# Patient Record
Sex: FEMALE | Race: ASIAN | NOT HISPANIC OR LATINO
[De-identification: names, ages, dates, MRNs, and addresses within clinical notes are randomized per-mention and may not be internally consistent; named-entity substitution may affect disease eponyms.]

---

## 2022-08-04 ENCOUNTER — APPOINTMENT (OUTPATIENT)
Dept: CARDIOLOGY | Facility: CLINIC | Age: 57
End: 2022-08-04
Payer: MEDICAID

## 2022-08-04 ENCOUNTER — APPOINTMENT (OUTPATIENT)
Dept: CARDIOLOGY | Facility: CLINIC | Age: 57
End: 2022-08-04

## 2022-08-04 VITALS
HEART RATE: 63 BPM | OXYGEN SATURATION: 97 % | HEIGHT: 62.2 IN | WEIGHT: 118 LBS | RESPIRATION RATE: 18 BRPM | DIASTOLIC BLOOD PRESSURE: 73 MMHG | BODY MASS INDEX: 21.44 KG/M2 | TEMPERATURE: 98 F | SYSTOLIC BLOOD PRESSURE: 111 MMHG

## 2022-08-04 DIAGNOSIS — Z78.9 OTHER SPECIFIED HEALTH STATUS: ICD-10-CM

## 2022-08-04 DIAGNOSIS — E78.5 HYPERLIPIDEMIA, UNSPECIFIED: ICD-10-CM

## 2022-08-04 DIAGNOSIS — Z82.49 FAMILY HISTORY OF ISCHEMIC HEART DISEASE AND OTHER DISEASES OF THE CIRCULATORY SYSTEM: ICD-10-CM

## 2022-08-04 PROBLEM — Z00.00 ENCOUNTER FOR PREVENTIVE HEALTH EXAMINATION: Status: ACTIVE | Noted: 2022-08-04

## 2022-08-04 PROCEDURE — 99204 OFFICE O/P NEW MOD 45 MIN: CPT

## 2022-08-04 PROCEDURE — 93306 TTE W/DOPPLER COMPLETE: CPT

## 2022-08-21 PROBLEM — E78.5 HLD (HYPERLIPIDEMIA): Status: ACTIVE | Noted: 2022-08-21

## 2024-04-02 PROBLEM — Z82.49 FAMILY HISTORY OF HYPERTENSION: Status: ACTIVE | Noted: 2024-04-02

## 2024-04-02 PROBLEM — Z78.9 SOCIAL ALCOHOL USE: Status: ACTIVE | Noted: 2024-04-02

## 2024-04-02 NOTE — HISTORY OF PRESENT ILLNESS
[FreeTextEntry1] : 57 year-old female with HLD presents for evaluation of abnormal ECG.  Patient was noted to have an abnormal ECG by PCP, showing q waves V3-V6, II, III, aVF.  Patient reports that recently she has experienced 3 episodes of SSCP when angry.  Patient denies CP or SOB with exertion.  Patient denies palpitations.  Patient denies h/o syncope.  Patient reports occasional dizziness and nausea.  I advised patient to undergo an echocardiogram.  Patient underwent an echocardiogram and it showed normal LV function without significant valvular pathology.  Patient's ECG abnormality likely represents a normal variant.

## 2024-04-03 ENCOUNTER — APPOINTMENT (OUTPATIENT)
Dept: CARDIOLOGY | Facility: CLINIC | Age: 59
End: 2024-04-03
Payer: MEDICAID

## 2024-04-03 VITALS
HEART RATE: 70 BPM | SYSTOLIC BLOOD PRESSURE: 142 MMHG | OXYGEN SATURATION: 99 % | TEMPERATURE: 97.8 F | WEIGHT: 124 LBS | RESPIRATION RATE: 18 BRPM | BODY MASS INDEX: 22.53 KG/M2 | DIASTOLIC BLOOD PRESSURE: 85 MMHG

## 2024-04-03 DIAGNOSIS — R07.89 OTHER CHEST PAIN: ICD-10-CM

## 2024-04-03 DIAGNOSIS — R94.31 ABNORMAL ELECTROCARDIOGRAM [ECG] [EKG]: ICD-10-CM

## 2024-04-03 PROCEDURE — 93306 TTE W/DOPPLER COMPLETE: CPT

## 2024-04-03 PROCEDURE — ZZZZZ: CPT

## 2024-04-03 PROCEDURE — 93015 CV STRESS TEST SUPVJ I&R: CPT

## 2024-04-03 PROCEDURE — 99214 OFFICE O/P EST MOD 30 MIN: CPT | Mod: 25

## 2024-04-03 NOTE — HISTORY OF PRESENT ILLNESS
[FreeTextEntry1] : Pt reports chest discomfort x 1 month. She also have been experiencing nausea/vomiting/diaphoresis, prompting ED visit. She states she had EKG, CXR, blood tests and were told everything was normal. She is still having occasional chest discomfort. It is not related to exertion. She saw PCP who gave her BP med but she hasn't started taking because she wants to change her lifestyle first. No orthopnea, PND, LE edema, dizziness, or syncope.  8/4/22 - Patient was noted to have an abnormal ECG by PCP, showing q waves V3-V6, II, III, aVF. Patient reports that recently she has experienced 3 episodes of SSCP when angry. Patient denies CP or SOB with exertion. Patient denies palpitations. Patient denies h/o syncope. Patient reports occasional dizziness and nausea. I advised patient to undergo an echocardiogram. Patient underwent an echocardiogram and it showed normal LV function without significant valvular pathology. Patient's ECG abnormality likely represents a normal variant.

## 2024-04-03 NOTE — REASON FOR VISIT
[Symptom and Test Evaluation] : symptom and test evaluation [Arrhythmia/ECG Abnorrmalities] : arrhythmia/ECG abnormalities [FreeTextEntry1] : 59 year old F with HLD who presents for evaluation of abnormal EKG and chest discomfort.  Pt was last seen by Dr. Woodruff 8/4/22 - Patient was noted to have an abnormal ECG by PCP, showing q waves V3-V6, II, III, aVF. Patient reports that recently she has experienced 3 episodes of SSCP when angry. Patient denies CP or SOB with exertion. Patient denies palpitations. Patient denies h/o syncope. Patient reports occasional dizziness and nausea. I advised patient to undergo an echocardiogram. Patient underwent an echocardiogram and it showed normal LV function without significant valvular pathology. Patient's ECG abnormality likely represents a normal variant.

## 2024-04-03 NOTE — ASSESSMENT
[FreeTextEntry1] : 59 year old F with HLD who presents for evaluation of abnormal EKG and chest discomfort.  Pt reports chest discomfort x 1 month. She also have been experiencing nausea/vomiting/diaphoresis, prompting ED visit. She states she had EKG, CXR, blood tests and were told everything was normal. She is still having occasional chest discomfort. It is not related to exertion. She saw PCP who gave her BP med but she hasn't started taking because she wants to change her lifestyle first. No orthopnea, PND, LE edema, dizziness, or syncope.  1) Abnormal EKG 2) Chest discomfort, atypical in nature - EKG 4/2/24 showed sinus rhythm with possible inferior q waves (similar to 2022 EKG) - I advised pt to undergo treadmill stress; pt did 8:50 min Rolan protocol, did not have CP, and had no ischemic ECG changes at 79% MPHR. However, pt did not reach target HR. - Repeat TTE4/3/24 showed normal LV function without significant valvular disease - We discussed that despite not reaching target HR, her testing is overall reassuring and her chest discomfort is very atypical in nature. If no other etiology for chest discomfort is found, can consider further cardiac testing at that time.  3) Follow-up, if recurrent/worsening symptoms

## 2025-04-04 ENCOUNTER — APPOINTMENT (OUTPATIENT)
Dept: CARDIOLOGY | Facility: CLINIC | Age: 60
End: 2025-04-04
Payer: MEDICAID

## 2025-04-04 ENCOUNTER — NON-APPOINTMENT (OUTPATIENT)
Age: 60
End: 2025-04-04

## 2025-04-04 VITALS
DIASTOLIC BLOOD PRESSURE: 73 MMHG | SYSTOLIC BLOOD PRESSURE: 118 MMHG | HEART RATE: 67 BPM | WEIGHT: 127 LBS | BODY MASS INDEX: 23.08 KG/M2 | OXYGEN SATURATION: 97 % | RESPIRATION RATE: 18 BRPM

## 2025-04-04 DIAGNOSIS — R07.89 OTHER CHEST PAIN: ICD-10-CM

## 2025-04-04 DIAGNOSIS — R00.1 BRADYCARDIA, UNSPECIFIED: ICD-10-CM

## 2025-04-04 PROCEDURE — 99214 OFFICE O/P EST MOD 30 MIN: CPT | Mod: 25

## 2025-04-04 PROCEDURE — 93000 ELECTROCARDIOGRAM COMPLETE: CPT

## 2025-05-01 ENCOUNTER — NON-APPOINTMENT (OUTPATIENT)
Age: 60
End: 2025-05-01